# Patient Record
Sex: MALE | Race: WHITE
[De-identification: names, ages, dates, MRNs, and addresses within clinical notes are randomized per-mention and may not be internally consistent; named-entity substitution may affect disease eponyms.]

---

## 2017-06-05 NOTE — OR
DATE OF OPERATION:

 

PREOPERATIVE DIAGNOSIS:  Right carpal tunnel syndrome.

 

POSTOPERATIVE DIAGNOSIS:  Right carpal tunnel syndrome.

 

PROCEDURE PERFORMED:  Right carpal tunnel release.

 

ANESTHESIA:  [place] block.

 

SURGEON:  Tarik Forrester M.D.

 

ASSISTANT:  Annita Feldman RN.

 

SPECIMENS:  None.

 

DRAINS:  None.

 

ESTIMATED BLOOD LOSS:  Minimal.

 

COMPLICATIONS:  None apparent.

 

DESCRIPTION OF PROCEDURE:  After informed consent was obtained, the surgical site was

marked, the patient was brought to the operating room. The right upper extremity was prepped

and draped sterilely, a time out was held, and antibiotics were confirmed. An incision was

made in line with the radial border of the ring finger from a line starting at the [place]

cardinal line to the volar wrist crease. We dissected sharply through the skin, subcutaneous

tissue, palmar aponeurosis, exposing the entire transverse carpal ligament. This was

released off the radial border of hook of hamate, releasing in its entirety. Next, the

distal aspect of the volar and a brachial fascia was released with sternotomies. We

copiously irrigated and closed with 4-0 nylon. Sterile dressings were applied and the

patient was brought to the recovery room in stable condition, having tolerated the procedure

well.

 

 

NESSA/ROBERT

DD:  06/05/2017 11:13:00

DT:  06/05/2017 13:04:07

Job #:  [id number]

## 2019-11-19 ENCOUNTER — HOSPITAL ENCOUNTER (OUTPATIENT)
Dept: HOSPITAL 60 - LB.LAB | Age: 69
End: 2019-11-19
Attending: NURSE PRACTITIONER
Payer: OTHER GOVERNMENT

## 2019-11-19 DIAGNOSIS — D72.829: Primary | ICD-10-CM

## 2019-11-27 LAB — T(ABL1,BCR)B2A2/CONTROL BLD/T: 0.29 %
